# Patient Record
Sex: FEMALE | Race: WHITE
[De-identification: names, ages, dates, MRNs, and addresses within clinical notes are randomized per-mention and may not be internally consistent; named-entity substitution may affect disease eponyms.]

---

## 2015-10-04 VITALS — DIASTOLIC BLOOD PRESSURE: 71 MMHG | SYSTOLIC BLOOD PRESSURE: 113 MMHG

## 2017-07-20 ENCOUNTER — HOSPITAL ENCOUNTER (OUTPATIENT)
Dept: HOSPITAL 24 - RAD | Age: 42
End: 2017-07-20
Attending: OBSTETRICS & GYNECOLOGY
Payer: COMMERCIAL

## 2017-07-20 DIAGNOSIS — Z12.31: Primary | ICD-10-CM

## 2017-07-20 PROCEDURE — 77067 SCR MAMMO BI INCL CAD: CPT

## 2017-07-21 NOTE — MG
HISTORY:  SCREENING 



Comparison: Multiple mammograms dating back to May 7, 2015.



FINDINGS: 

Bilateral CC and MLO projections of the right and left breast were obtained.  Scattered fibroglandul
ar tissue is seen to be present.  Stable appearing asymmetric breast tissue within the outer left br
east. No significant architectural distortion, mass or clustered microcalcifications can be observed
 to suggest malignancy.  No skin thickening or nipple retraction is appreciated.   No pathological l
ymphadenopathy can be identified. 



IMPRESSION:  NO RADIOGRAPHIC EVIDENCE OF MALIGNANCY.  

 

ACR CATEGORY: 2 - benign findings.

 

FOLLOW-UP EXAM 1 YEAR. 



Diagnostic CAD was utilized and reviewed.



* 0 (ZERO) - ASSESSMENT INCOMPLETE; ADDITIONAL IMAGING IS NEEDED. 

* 1/1 (ONE) - NEGATIVE. 

* 2/II (TWO) - BENIGN FINDINGS.

 * 3/III (THREE) - PROBABLY BENIGN FINDING; SHORT INTERVAL FOLLOW-UP

SUGGESTED.

 * 4/IV (FOUR) - SUSPICIOUS ABNORMALITY; BIOPSY SHOULD BE CONSIDERED.

 * 5/V - HIGHLY SUSPICIOUS OF MALIGNANCY; BIOPSY SHOULD BE PERFORMED.

 

A NEGATIVE X-RAY REPORT SHOULD NOT DELAY BIOPSY IF A DOMINANT OR

CLINICALLY SUSPICIOUS MASS IS PRESENT; 4 TO 8 PERCENT OF CANCERS ARE NOT IDENTIFIED BY X-RAY.  A NEG
ATIVE REPORT MAY REINFORCE THE CLINICAL IMPRESSION.  ADENOSIS AND DENSE BREASTS MAY OBSCURE AN UNDER
LYING NEOPLASM.





Reported By:Electronically Signed by HENNA CASTRO MD at 7/21/2017 11:20:20 AM

## 2018-04-16 ENCOUNTER — HOSPITAL ENCOUNTER (EMERGENCY)
Dept: HOSPITAL 24 - ER | Age: 43
Discharge: HOME | End: 2018-04-16
Payer: COMMERCIAL

## 2018-04-16 VITALS — DIASTOLIC BLOOD PRESSURE: 58 MMHG | SYSTOLIC BLOOD PRESSURE: 114 MMHG

## 2018-04-16 VITALS — BODY MASS INDEX: 29.8 KG/M2

## 2018-04-16 DIAGNOSIS — M50.322: Primary | ICD-10-CM

## 2018-04-16 DIAGNOSIS — M79.601: ICD-10-CM

## 2018-04-16 PROCEDURE — 96372 THER/PROPH/DIAG INJ SC/IM: CPT

## 2018-04-16 PROCEDURE — 72040 X-RAY EXAM NECK SPINE 2-3 VW: CPT

## 2018-04-16 PROCEDURE — 99282 EMERGENCY DEPT VISIT SF MDM: CPT

## 2018-04-16 NOTE — RAD
Exam: Cervical spine AP and lateral views



History: 43-year-old female with neck and right arm pain



Comparison: None



Findings:

There is straightening of the cervical spine with loss of the usual cervical lordosis. This may refle
ct underlying muscle spasm or patient positioning. No evidence of vertebral body offset however. Ther
e is mild narrowing of the C5-6 disc space. The other cervical disc spaces are maintained. No acute b
prasad abnormality is seen on this study.



Impression:

Mild degenerative disc disease at the C5-6 level. Remainder the exam is unremarkable however.



Reported By:Electronically Signed by RHINA CASTILLO MD at 4/16/2018 5:45:44 PM
